# Patient Record
Sex: FEMALE
[De-identification: names, ages, dates, MRNs, and addresses within clinical notes are randomized per-mention and may not be internally consistent; named-entity substitution may affect disease eponyms.]

---

## 2020-09-22 ENCOUNTER — DASHBOARD ENCOUNTERS (OUTPATIENT)
Age: 40
End: 2020-09-22

## 2020-09-24 ENCOUNTER — OFFICE VISIT (OUTPATIENT)
Dept: URBAN - METROPOLITAN AREA TELEHEALTH 2 | Facility: TELEHEALTH | Age: 40
End: 2020-09-24
Payer: COMMERCIAL

## 2020-09-24 ENCOUNTER — TELEPHONE ENCOUNTER (OUTPATIENT)
Dept: URBAN - METROPOLITAN AREA CLINIC 118 | Facility: CLINIC | Age: 40
End: 2020-09-24

## 2020-09-24 DIAGNOSIS — R14.0 BLOATING: ICD-10-CM

## 2020-09-24 DIAGNOSIS — K59.01 CONSTIPATION: ICD-10-CM

## 2020-09-24 PROBLEM — 14760008 CONSTIPATION: Status: ACTIVE | Noted: 2020-09-24

## 2020-09-24 PROBLEM — 60728008 BLOATING: Status: ACTIVE | Noted: 2020-09-24

## 2020-09-24 PROCEDURE — G8420 CALC BMI NORM PARAMETERS: HCPCS | Performed by: INTERNAL MEDICINE

## 2020-09-24 PROCEDURE — 99204 OFFICE O/P NEW MOD 45 MIN: CPT | Performed by: INTERNAL MEDICINE

## 2020-09-24 PROCEDURE — G8427 DOCREV CUR MEDS BY ELIG CLIN: HCPCS | Performed by: INTERNAL MEDICINE

## 2020-09-24 PROCEDURE — 1036F TOBACCO NON-USER: CPT | Performed by: INTERNAL MEDICINE

## 2020-09-24 NOTE — HPI-TODAY'S VISIT:
The patient presents for evaluation of chronic constipation.  She has had constipation for 4+ years.  started initially after having back injury and taking pain meds.  has been off pain meds but continues to have issues with constipation, previously was taking rectal suppositories to  have bm's.  she has been of suppositories recently, and tries to have bm's with increasing water intake.  + bloating with constipation.  denies abd pain, gi bleeding, weight loss or family h/o colon cancer.